# Patient Record
Sex: FEMALE | Race: WHITE
[De-identification: names, ages, dates, MRNs, and addresses within clinical notes are randomized per-mention and may not be internally consistent; named-entity substitution may affect disease eponyms.]

---

## 2019-07-08 ENCOUNTER — HOSPITAL ENCOUNTER (OUTPATIENT)
Dept: HOSPITAL 88 - RAD | Age: 54
End: 2019-07-08
Attending: INTERNAL MEDICINE
Payer: COMMERCIAL

## 2019-07-08 DIAGNOSIS — M25.561: ICD-10-CM

## 2019-07-08 DIAGNOSIS — M25.562: Primary | ICD-10-CM

## 2019-07-08 PROCEDURE — 73565 X-RAY EXAM OF KNEES: CPT

## 2019-07-08 NOTE — DIAGNOSTIC IMAGING REPORT
Exam:  Bilateral Knee radiograph-1 view



History: Knee pain



Comparison: None



Findings:  



Single AP view of both knees demonstrates no acute fracture or dislocation.

Moderate medial compartment predominant degenerative changes with joint space

narrowing and osteophyte formation.



Impression:

No acute osseous injury.



Moderate medial compartment degenerative changes in both knees.



Signed by: Vania Mccray MD on 7/8/2019 12:35 PM

## 2019-09-23 ENCOUNTER — HOSPITAL ENCOUNTER (EMERGENCY)
Dept: HOSPITAL 88 - FSED | Age: 54
Discharge: HOME | End: 2019-09-23
Payer: COMMERCIAL

## 2019-09-23 VITALS — HEIGHT: 65 IN | BODY MASS INDEX: 32.82 KG/M2 | WEIGHT: 197 LBS

## 2019-09-23 DIAGNOSIS — L23.7: ICD-10-CM

## 2019-09-23 DIAGNOSIS — L24.7: Primary | ICD-10-CM

## 2019-09-23 PROCEDURE — 99282 EMERGENCY DEPT VISIT SF MDM: CPT

## 2019-09-23 NOTE — XMS REPORT
[facility] Summary

                             Created on: 2019



Linh Chan

External Reference #: [id number]

: 1965

Sex: Female



Demographics







                          Address                   [address] [place]  [address]

 

                          Home Phone                [phone]

 

                          Preferred Language        English

 

                          Marital Status            M

 

                          Faith Affiliation     Unknown

 

                          Race                      Unknown

 

                          Ethnic Group              Unknown





Author







                          Author                    Admin, Fox

 

                          Organization              [facility]

 

                          [address]                   [address]

[place]  [address]



 

                          Phone                     [phone]







Allergies, Adverse Reactions, Alerts







           Allergy Name    Reaction Description    Start Date    Severity    Status     Provider

 

           No Known Allergies                                         Amirah Hinton CMA







Conditions or Problems







        Problem Name    Problem Code    Onset Date    Status    Entry Date    Provider    Comment    Standard

 Description                            Annotate

 

          Gyn well woman exam    V72.31        Active        Leroy Cassidy MD     

                          Routine gynecological examination     







Medication List







        Medication    Instructions    Start Date    Stop Date    Generic Name    NDC     Status    Provider

                                        Patient Instruction

 

        Drug Treatment Unknown - unknown                                                             







Vital Signs







           Date       Name       Value      Unit       Range      Description

 

               blood pressure, diastolic, second observation    86         mm[Hg]                BP pelletier



 

               blood pressure, diastolic    83         mm[Hg]                BP pelletier

 

               blood pressure, systolic, second observation    147        mm[Hg]                BP sys



 

               blood pressure, systolic    149        mm[Hg]                BP sys

 

               height E&M    65         [in_us]               Bdy height

 

               pulse rate E&M    74         /min                  Heart rate

 

               respiratory rate E&M    18         /min                  Resp rate

 

               temperature E&M    98.4       [degF]                Body temperature

 

               weight E&M    217        [lb_av]               Weight Measured







Diagnostic Results







           Date       Name       Value      Unit       Range      Description

 

                                        Lab Report: CBC With Differential/Platelet, Comp. Metabolic Panel (14),  ... - Chemistry

 

 

               thyroid stimulating hormone, serum    1.640      u[iU]/mL    0.450-4.500     

 

               very low density lipoproteins    19         mg/dL      5-40        

 

               chloride, serum    102        mmol/L           

 

               urea nitrogen, blood    11         mg/dL      6-24        

 

                                        Lab Report: CBC With Differential/Platelet, Comp. Metabolic Panel (14),  ... - Hematology

 

 

                 mean corpuscular hemoglobin concentration, RBC    32.7 G/DL    %            31.5-35.7

                                         

 

               erythrocyte (RBC) count    4.50 X10E6/UL    10*6/mm3    3.77-5.28     

 

                                        Lab Report: CBC With Differential/Platelet, Comp. Metabolic Panel (14),  ... - Chemistry

 

 

               Absolute Neutrophils    2.3 X10E3/UL    10*3/uL    1.4-7.0     

 

               LDL cholesterol, serum    117        mg/dL      0-99        

 

               urea nitrogen/creatinine ratio, serum    20                    9-23        

 

                                        Lab Report: CBC With Differential/Platelet, Comp. Metabolic Panel (14),  ... - Hematology

 

 

               mean corpuscular volume, RBC    80         fL         79-97       

 

                                        Lab Report: CBC With Differential/Platelet, Comp. Metabolic Panel (14),  ... - Chemistry

 

 

               HDL cholesterol, serum    99         mg/dL      >39         

 

                                        Lab Report: CBC With Differential/Platelet, Comp. Metabolic Panel (14),  ... - Hematology

 

 

               monocytes as percent of blood leukocytes    6          %          Not Estab.     

 

                                        Lab Report: CBC With Differential/Platelet, Comp. Metabolic Panel (14),  ... - Chemistry

 

 

               creatinine, serum    0.56       mg/dL      0.57-1.00     

 

               albumin/globulin ratio, serum    1.3                   1.2-2.2     

 

                                        Lab Report: Vaginitis/Vaginosis, DNA Probe, Ct, Ng, Trich vag by [facility] - Urinalysis

 

 

               trichomonas vaginalis, urine    Negative               Negative     

 

                                        Lab Report: CBC With Differential/Platelet, Comp. Metabolic Panel (14),  ... - Chemistry

 

 

               cholesterol, serum    235        mg/dL      100-199     

 

               bilirubin, serum, total    0.3        mg/dL      0.0-1.2     

 

                                        Lab Report: CBC With Differential/Platelet, Comp. Metabolic Panel (14),  ... - Hematology

 

 

               Eosinophil Absolute Count    0.1 X10E3/UL    10*3/uL    0.0-0.4     

 

                                        Lab Report: Vaginitis/Vaginosis, DNA Probe, Ct, Ng, Trich vag by [facility] - Lab 

 

               chlamydia DNA probe    Negative               Negative     

 

                                        Lab Report: CBC With Differential/Platelet, Comp. Metabolic Panel (14),  ... - Chemistry

 

 

               aspartate aminotransferase (SGOT), serum    18         U/L        0-40        

 

                                        Lab Report: CBC With Differential/Platelet, Comp. Metabolic Panel (14),  ... - Hematology

 

 

               red blood cell distribution width    14.9       %          12.3-15.4     

 

               leukocyte count, blood    5.0 X10E3/UL    10*3/mm3    3.4-10.8     

 

                                        Lab Report: CBC With Differential/Platelet, Comp. Metabolic Panel (14),  ... - Chemistry

 

 

               potassium, serum    4.5        mmol/L     3.5-5.2     

 

               albumin, serum    4.2        g/dL       3.5-5.5     

 

                 immature granulocytes, percentage of total cells, blood    0            %            Not Estab.

                                         

 

                                        Lab Report: CBC With Differential/Platelet, Comp. Metabolic Panel (14),  ... - Hematology

 

 

                 lymphocyte count, blood, automated    2.3 X10E3/UL    10*3/mm3     0.7-3.1 

                                         

 

                                        Lab Report: Vaginitis/Vaginosis, DNA Probe, Ct, Ng, Trich vag by [facility] - Microbiology

 

 

               Neisseria gonorrhoeae DNA probe    Negative               Negative     

 

                                        Lab Report: CBC With Differential/Platelet, Comp. Metabolic Panel (14),  ... - Hematology

 

 

               hematocrit, blood    35.8       %          34.0-46.6     

 

                                        Lab Report: CBC With Differential/Platelet, Comp. Metabolic Panel (14),  ... - Chemistry

 

 

               sodium, serum    139        mmol/L     134-144     

 

                                        Lab Report: CBC With Differential/Platelet, Comp. Metabolic Panel (14),  ... - Hematology

 

 

               neutrophils as percent of blood leukocytes    46         %          Not Estab.     

 

               basophils as percent of blood leukocytes    1          %          Not Estab.     

 

                                        Lab Report: CBC With Differential/Platelet, Comp. Metabolic Panel (14),  ... - Chemistry

 

 

               carbon dioxide, venous blood    22         mmol/L     20-29       

 

               triglyceride, serum, fasting    96         mg/dL      0-149       

 

               calcium, serum    9.6        mg/dL      8.7-10.2     

 

               alanine aminotransferase (SGPT), serum    17         U/L        0-32        

 

                                        Lab Report: CBC With Differential/Platelet, Comp. Metabolic Panel (14),  ... - Hematology

 

 

               mean corpuscular hemoglobin, RBC    26.0       pg         26.6-33.0     

 

                                        Lab Report: CBC With Differential/Platelet, Comp. Metabolic Panel (14),  ... - Chemistry

 

 

               protein, total, serum    7.5        g/dL       6.0-8.5     

 

               alkaline phosphatase, serum    52         U/L              

 

                                        Lab Report: CBC With Differential/Platelet, Comp. Metabolic Panel (14),  ... - Hematology

 

 

               hemoglobin, blood    11.7       g/dL       11.1-15.9     

 

               lymphocytes as percent of blood leukocytes    45         %          Not Estab.     

 

                                        Lab Report: CBC With Differential/Platelet, Comp. Metabolic Panel (14),  ... - Chemistry

 

 

               hemoglobin A1C, blood, as % of total hemoglobin    6.0        %          4.8-5.6     

 

                                        Lab Report: CBC With Differential/Platelet, Comp. Metabolic Panel (14),  ... - Genetics/fertility

 

 

               eGFR if African American    124        mL/min/1.73m2    >59         

 

                                        Lab Report: CBC With Differential/Platelet, Comp. Metabolic Panel (14),  ... - Hematology

 

 

               basophil count, absolute    0.0 x10E3/uL               0.0-0.2     

 

                                        Lab Report: CBC With Differential/Platelet, Comp. Metabolic Panel (14),  ... - Chemistry

 

 

               globulin, serum    3.3                   1.5-4.5     

 

                 Estimated Glomerular Filtration Rate (calc)    108          mL/min/1.73m2    >59

                                         

 

                                        Lab Report: CBC With Differential/Platelet, Comp. Metabolic Panel (14),  ... - Hematology

 

 

               eosinophils as percent of blood leukocytes    2          %          Not Estab.     

 

                                        Lab Report: CBC With Differential/Platelet, Comp. Metabolic Panel (14),  ... - Chemistry

 

 

               blood glucose, random    107        mg/dL      65-99       

 

                                        Lab Report: CBC With Differential/Platelet, Comp. Metabolic Panel (14),  ... - Hematology

 

 

               monocyte count, blood, automated    0.3 X10E3/UL    10*3/uL    0.1-0.9     

 

               platelet count    292 X10E3/UL    10*3/mm3    150-450     







Procedures







             Code         Procedure Name    Date         Entry Date    Standard Description

 

                    CPT-47265           New Patient Well Exam (40 - 64 Yrs) - [address]     08:59:00 CDT

## 2019-09-23 NOTE — XMS REPORT
Patient Summary Document

                             Created on: 2019



MARY ANN JAIME

External Reference #: [id number]

: 1965

Sex: Female



Demographics







                          Address                   [address] [place]  [address]

 

                          Home Phone                [phone]

 

                          Preferred Language        Unknown

 

                          Marital Status            Unknown

 

                          Advent Affiliation     Unknown

 

                          Race                      Unknown

 

                                        Additional Race(s)  

 

                          Ethnic Group              Unknown





Author







                          Author                    [facility]

 

                          Address                   Unknown

 

                          Phone                     Unavailable







Support







                Name            Relationship    Address         Phone

 

                    MULU JAIME    PRS                 [place]

[place]  [id number] [phone]

 

                    SETH RODARTE     PRS                 [address] [place]  [address]                     [phone]







Care Team Providers







                    Care Team Member Name    Role                Phone

 

                    POLY HOPSON     Unavailable         Unavailable







Payers







             Payer Name    Policy Type    Policy Number    Effective Date    Expiration Date







Problems

This patient has no known problems.



Allergies, Adverse Reactions, Alerts







          Allergy Name    Allergy Type    Status    Severity    Reaction(s)    Onset Date    Inactive 

Date                      Treating Clinician        Comments

 

        No Known Allergies    DA      Active    U               2017 00:00:00                     







Medications

This patient has no known medications.



Results







           Test Description    Test Time    Test Comments    Text Results    Atomic Results    Result

 Comments

 

                - MRI LW JNT W/O CONT BI    2019 15:23:00                     Patient Name: MARY ANN JAIME

                Unit No: [id number]        EXAMS:                               
               CPT CODE:       [id number] MRI LW JNT W/O CONT BI                 
   47365                    MRI OF THE LEFT KNEE               DIAGNOSIS:       
       1.  There are radial tears of the anterior and posterior roots of the    
  medial meniscus.       2.  Chondromalacia of the medial compartment of the 
knee with       partial-thickness cartilage loss and of the medial patellar 
facet with       partial-thickness cartilage loss.  There are marginal 
osteophytes in       all compartments of the knee and multiple osseous loose 
bodies       posteriorly in the midline.  Bone marrow edema is present in the   
   medial tibial plateau consistent with stress change.  A small joint       
effusion is present.  Edema is seen in Hoffa's fat consistent with       
synovitis.               COMMENT:               COMPARISON: No prior exams 
available.               Scans were performed in the sagittal, axial and coronal
planes       utilizing T1, spin density with fat saturation and T2-weighted 
pulse       sequences.               Bony and hyaline cartilage abnormalities 
are present as noted.  The       medial meniscus is torn with degenerative 
signal.  The lateral       meniscus is within normal limits in signal and 
configuration.  No       abnormality is seen involving the anterior or posterior
cruciate or       medial or lateral collateral ligaments.  The quadriceps and 
patellar       tendons appear normal.               MRI OF THE RIGHT KNEE       
       DIAGNOSIS:               1.  Irregular tears of the anterior and 
posterior roots of the medial       meniscus are noted.       2.  Chondromalacia
is seen with full-thickness cartilage loss       involving the medial patellar f
acet and the medial femoral condyle.        There is stress edema on both sides 
of the joint medially.  Marginal       osteophytes are seen diffusely with edema
in Hoffa's fat consistent       with synovitis.  A small joint effusion is 
present with multiple loose       bodies.               COMMENT:               
COMPARISON: No prior exams available.               Scans were performed in the 
sagittal, axial and coronal planes       utilizing T1, spin density with fat 
saturation and T2-weighted pulse       sequences.               Bony and hyaline
cartilage abnormalities are present as noted.  The       medial meniscus is torn
as described.  The lateral meniscus is within       normal limits in signal and 
configuration.  No abnormality is seen       involving the anterior or posterior
cruciate or medial or lateral        [facility]        NAME: 
MARY ANN JAIME                  [address]                     PHYS: 
GOMM. - Piper Reyes                                        : 
1965 AGE: 54      SEX: F   [place], [place] [address]                ACCT NO: 
[id number] LOC: Y.MRI        PHONE #: [phone]               EXAM DATE: 
2019 STATUS: REG CLI     FAX #: [phone]               RAD #:         
   D/C DT                PAGE  1                       Signed Report            
       (CONTINUED)  Patient Name: MARY ANN JAIME                Unit No: 
[id number]        EXAMS:                                               CPT CODE:
      [id number] MRI LW JNT W/O CONT BI                     93554                
<Continued>        collateral ligaments.  The quadriceps and patellar tendons 
appear       normal.                    ** Electronically Signed by Bob Solomon MD **            **             on 2019 at 1523            **      
               Reported and signed by: Bob Solomon MD                     
                           CC: Piper Reyes MD                            
                                                                                
       Technologist: HANNA CROCKER. RT(R)                                   
[facility]        NAME: MARY ANN JAIME                  [address] 
[facility] [address]                     PHYS: GOMMU. - Piper Reyes              
                         : 1965 AGE: 54      SEX: F   [place], [place] 
[address]                ACCT NO: [id number] LOC: Y.MRI        PHONE #: 
[phone]               EXAM DATE: 2019 STATUS: REG CLI     FAX #: 
[phone]               RAD #:             D/C DT                PAGE  2     
                 Signed Report                                 Patient Name: 
MARY ANN JAIME                Unit No: [id number]        EXAMS:                
                              CPT CODE:       [id number] MRI LW JNT W/O CONT BI  
                  [address]83                <Continued>  Transcribed D/T: 2019 
(6363) t.SDR.JCL                                                                
          [facility]        NAME: MARY ANN JAIME             
    [address]                     PHYS: GOMMU.01 - Piper Reyes M     
                                  : 1965 AGE: 54      SEX: F   [place], [place] [address]                ACCT NO: [id number] LOC: Y.MRI        PHONE #: 
[phone]               EXAM DATE: 2019 STATUS: REG CLI     FAX #: 
[phone]               RAD #:             D/C DT                PAGE  3     
                 Signed Report                                    

 

                SCR MAMM BILATERAL TASHIA CAD DIGITAL    2019 16:22:21                     - SCR MAMM BILATERAL

 TASHIA CAD DIGITALBILATERAL DIGITAL SCREENING MAMMOGRAM 3D/2D WITH CAD: 
2019CLINICAL: Asymptomatic.  Digital breast tomosynthesis was performed in 
addition to routine CC and MLO views.  Current mammographic images were 
evaluated by either a [employer] M-Vu or a [employer] Image[employer] CAD (computer aided 
detection system).  Comparison is made to exams dated  2018 mammogram,  mammogram, and 2009 mammogram - [facility].  The 
tissue of both breasts is heterogeneously dense. This may lower the sensitivity 
of mammography.  No suspicious mass, architectural distortion, malignant type 
calcification, or lymph node abnormality detected.  Breast architecture is 
stable compared to prior exams.IMPRESSION: NEGATIVEThere is no mammographic 
evidence of malignancy. Resume annual screening mammography in one year.  Osiris flores/penloretta:2019 16:22:21  Imaging Technologist: Darshana KENDALL, [facility] sent: BIRADS 1-2 Normal  Mammogram 
BI-RADS: 1 Negative                      

 

                KNEES STANDING AP VIEW    2019 12:34:00                                                    

                                                      [place] [address]      Patient Name: MARY ANN JAIME                                  
MR #: [id number]                     : 1965                            
      Age/Sex: 53/F  Acct #: [id number]                              Req #: 
[id number]  [facility] Physician:                                                      
Ordered by: POLY HOPSON MD                            Report #: [id number]    
   Location: [facility]                                     Room/Bed:                  
  
___________________________________________________________________________________________________
   Procedure: [id number] DX/KNEES STANDING AP VIEW  Exam Date: 19          
                 Exam Time: 1150                                              
REPORT STATUS: Signed       Exam:  Bilateral Knee radiograph-1 view      Histo
ry: Knee pain      Comparison: None      Findings:        Single AP view of both
knees demonstrates no acute fracture or dislocation.   Moderate medial 
compartment predominant degenerative changes with joint space   narrowing and 
osteophyte formation.      Impression:   No acute osseous injury.      Moderate 
medial compartment degenerative changes in both knees.      Signed by: Randall Carter MD on 2019 12:35 PM        Dictated By: RANDALL CARTER MD  Electronically 
Signed By: RANDALL CARTER MD on 19 1235  Transcribed By: LEXI on 19 
1235       COPY TO:   POLY HOPSON MD